# Patient Record
Sex: FEMALE | HISPANIC OR LATINO | Employment: FULL TIME | URBAN - METROPOLITAN AREA
[De-identification: names, ages, dates, MRNs, and addresses within clinical notes are randomized per-mention and may not be internally consistent; named-entity substitution may affect disease eponyms.]

---

## 2019-11-14 ENCOUNTER — NON-PROVIDER VISIT (OUTPATIENT)
Dept: URGENT CARE | Facility: PHYSICIAN GROUP | Age: 49
End: 2019-11-14

## 2019-11-14 DIAGNOSIS — Z02.1 PRE-EMPLOYMENT DRUG SCREENING: ICD-10-CM

## 2019-11-14 LAB
AMP AMPHETAMINE: NEGATIVE
COC COCAINE: NEGATIVE
INT CON NEG: NEGATIVE
INT CON POS: POSITIVE
MET METHAMPHETAMINES: NEGATIVE
OPI OPIATES: NEGATIVE
PCP PHENCYCLIDINE: NEGATIVE
POC DRUG COMMENT 753798-OCCUPATIONAL HEALTH: NEGATIVE
THC: NEGATIVE

## 2019-11-14 PROCEDURE — 80305 DRUG TEST PRSMV DIR OPT OBS: CPT | Performed by: NURSE PRACTITIONER

## 2020-02-05 ENCOUNTER — OFFICE VISIT (OUTPATIENT)
Dept: URGENT CARE | Facility: PHYSICIAN GROUP | Age: 50
End: 2020-02-05

## 2020-02-05 VITALS
BODY MASS INDEX: 21.71 KG/M2 | SYSTOLIC BLOOD PRESSURE: 102 MMHG | DIASTOLIC BLOOD PRESSURE: 66 MMHG | WEIGHT: 115 LBS | OXYGEN SATURATION: 98 % | HEIGHT: 61 IN | RESPIRATION RATE: 18 BRPM | HEART RATE: 70 BPM | TEMPERATURE: 97.8 F

## 2020-02-05 DIAGNOSIS — K64.4 INFLAMED EXTERNAL HEMORRHOID: ICD-10-CM

## 2020-02-05 DIAGNOSIS — B37.89 CANDIDIASIS OF ANUS: ICD-10-CM

## 2020-02-05 PROCEDURE — 99203 OFFICE O/P NEW LOW 30 MIN: CPT | Performed by: PHYSICIAN ASSISTANT

## 2020-02-05 RX ORDER — HYDROCORTISONE ACETATE 25 MG/1
25 SUPPOSITORY RECTAL EVERY 12 HOURS
Qty: 20 SUPPOSITORY | Refills: 0 | Status: SHIPPED | OUTPATIENT
Start: 2020-02-05 | End: 2020-02-15

## 2020-02-06 NOTE — PROGRESS NOTES
Subjective:      Anjana Perez is a 49 y.o. female who presents with Urticaria (around anus, painful, super itchy, inflammed bumps that come out when using the restroom [id number])    PMH:  has a past medical history of Dental disorder and Pain (09/24/14). She also has no past medical history of Cold, Diabetes, or Seizure ([facility]).  MEDS:   Current Outpatient Medications:   •  methylPREDNISolone (MEDROL DOSEPACK) 4 MG tablet, Take according to package instructions (Patient not taking: Reported on 2/5/2020), Disp: 21 Tab, Rfl: 0  •  naproxen (NAPROSYN) 500 MG Tab, Take 1 Tab by mouth 2 times a day, with meals. (Patient not taking: Reported on 2/5/2020), Disp: 30 Tab, Rfl: 0  •  ibuprofen (MOTRIN) 200 MG TABS, Take 200 mg by mouth every 6 hours as needed., Disp: , Rfl:   ALLERGIES: No Known Allergies  SURGHX:   Past Surgical History:   Procedure Laterality Date   • UMBILICAL HERNIA REPAIR  9/25/2014    Performed by Edmar Hernandez M.D. at SURGERY [facility]   • OTHER ABDOMINAL SURGERY  2009    umbilical hernia repair   • GYN SURGERY      C SECTION     SOCHX:  reports that she has never smoked. She has never used smokeless tobacco. She reports that she does not drink alcohol or use drugs.  FH: Reviewed with patient, not pertinent to this visit.           Patient presents with:  Hemorrhoids, itching around anus, painful, super itchy, inflammed bumps that come out when using the restroom [id number].  Pt has been using otc cream but thinks it is giving her a rash around her anus.  Pt denies bleeding from hemorrhoids, would like some medicine to make them go away.  Pt admits to history of constipation but not hemorrhoids.     Pt has recently started a new job, stands all day.      A qualified  was used to interpret Portuguese during this encounter.  ’s name/ID number was [id number] and mode of interpretation was  iPAD .        Rash   This is a new problem. Episode onset: 2  "weeks. The problem is unchanged. The affected locations include the left buttock and right buttock. The rash is characterized by burning, pain, itchiness, redness and swelling. She was exposed to a new medication (otc hemorrhoid cream). Pertinent negatives include no anorexia, diarrhea, fever or vomiting. Past treatments include cold compress (otc cream). The treatment provided no relief. There is no history of eczema or varicella.       Review of Systems   Constitutional: Negative for fever.   Gastrointestinal: Positive for constipation. Negative for anorexia, blood in stool, diarrhea, melena, nausea and vomiting.   Skin: Positive for itching and rash.   All other systems reviewed and are negative.         Objective:     /66 (BP Location: Right arm, Patient Position: Sitting, BP Cuff Size: Adult)   Pulse 70   Temp 36.6 °C (97.8 °F) (Temporal)   Resp 18   Ht 1.549 m (5' 1\")   Wt 52.2 kg (115 lb)   SpO2 98%   BMI 21.73 kg/m²      Physical Exam  Vitals signs and nursing note reviewed. Exam conducted with a chaperone present.   Constitutional:       Appearance: Normal appearance. She is well-developed, well-groomed and normal weight. She is not ill-appearing or toxic-appearing.   HENT:      Head: Normocephalic and atraumatic.      Right Ear: Tympanic membrane normal.      Left Ear: Tympanic membrane normal.      Nose: Nose normal.      Mouth/Throat:      Mouth: Mucous membranes are moist.      Pharynx: Oropharynx is clear.   Eyes:      Extraocular Movements: Extraocular movements intact.      Conjunctiva/sclera: Conjunctivae normal.      Pupils: Pupils are equal, round, and reactive to light.   Neck:      Musculoskeletal: Normal range of motion and neck supple.   Cardiovascular:      Rate and Rhythm: Normal rate and regular rhythm.      Pulses: Normal pulses.      Heart sounds: Normal heart sounds.   Pulmonary:      Effort: Pulmonary effort is normal.      Breath sounds: Normal breath sounds.   Abdominal: "      Palpations: Abdomen is soft.   Genitourinary:     Rectum: External hemorrhoid present. No anal fissure. Normal anal tone.       Musculoskeletal: Normal range of motion.   Skin:     General: Skin is warm and dry.      Capillary Refill: Capillary refill takes less than 2 seconds.   Neurological:      General: No focal deficit present.      Mental Status: She is alert and oriented to person, place, and time.      Gait: Gait normal.   Psychiatric:         Mood and Affect: Mood normal.         Behavior: Behavior is cooperative.                 Assessment/Plan:     1. Inflamed external hemorrhoid  hydrocortisone (ANUSOL-HC) 25 MG Suppos    nystatin/triamcinolone (MYCOLOG) 095547-1.1 UNIT/GM-% Cream   2. Candidiasis of anus  hydrocortisone (ANUSOL-HC) 25 MG Suppos    nystatin/triamcinolone (MYCOLOG) 036006-7.1 UNIT/GM-% Cream     Pt given handout in Congolese.     PT can use cool compress on affected area for relief of symptoms.      PT can take over the counter meds as follows:  Stool softener (docusate sodium 100mg) one capsule 2 times daily.  Miralax 1 capful 1-2 times daily until stools become soft.   Milk of magnesia 4 Tbs once by mouth followed by 20-40 oz of water daily.   Glycerin rectal suppository if no relief from above.     PT can use these medications until constipation resolves.       PT should follow up with PCP in 1-2 days for re-evaluation if symptoms have not improved.  Discussed red flags and reasons to return to UC or ED.  Pt and/or family verbalized understanding of diagnosis and follow up instructions and was offered informational handout on diagnosis.  PT discharged.

## 2020-02-06 NOTE — PATIENT INSTRUCTIONS
Hemorroides  (Hemorrhoids)  Las hemorroides son venas inflamadas adentro o alrededor del recto o del ano. Hay dos tipos de hemorroides:  · Hemorroides internas. Se cynthia en las venas del interior del recto. Pueden abultarse hacia afuera, irritarse y doler.  · Hemorroides externas. Se producen en las venas externas del ano y pueden sentirse leonela un bulto o kuldip hinchada, dura y dolorosa cerca del ano.  La mayoría de las hemorroides no causan problemas graves y se pueden controlar con tratamientos caseros leonela los cambios en la dieta y el estilo de margy. Si los tratamientos caseros no ayudan con los síntomas, se pueden realizar procedimientos para reducir o extirpar las hemorroides.  CAUSAS  La causa de esta afección es el aumento de la presión en la kuldip anal. Esta presión puede ser causada por distintos factores, por ejemplo:  · Estreñimiento.  · Dificultad para defecar.  · Diarrea.  · Embarazo.  · Obesidad.  · Estar sentado victoria largos períodos.  · Levant[place] objetos pesados u otras actividades que impliquen esfuerzo.  · Sexo anal.  SÍNTOMAS  Los síntomas de esta afección incluyen lo siguiente:  · Dolor.  · Picazón o irritación anal.  · Sangrado rectal.  · Pérdida de materia fecal (heces).  · Inflamación anal.  · Dany o más bultos alrededor del ano.  DIAGNÓSTICO  Esta afección se diagnostica frecuentemente a través de un examen visual. Posiblemente le realicen otros tipos de pruebas o estudios, leonela los siguientes:  · Examen de la kuldip rectal con naz mano enguantada (examen digital rectal).  · Examen del canal anal utilizando un pequeño tubo (anoscopio).  · Análisis de janneth si ha perdido naz cantidad significativa de janneth.  · Un estudio para observar el interior del colon (sigmoidoscopia o colonoscopia).  TRATAMIENTO  Esta afección generalmente se puede tratar en el hogar. Se pueden realizar diversos procedimientos si los cambios en la dieta, en el estilo de margy y otros tratamientos caseros no alivian los  síntomas. Estos procedimientos pueden ayudar a reducir o extirpar las hemorroides completamente. Algunos de estos procedimientos son quirúrgicos y otros no. Algunos de los procedimientos más frecuentes son los siguientes:  · Ligadura con butts elástica. Las bandas elásticas se colocan en la base de las hemorroides para interrumpir la irrigación de janneth.  · Escleroterapia. Se inyecta un medicamento en las hemorroides para reducir vo tamaño.  · Coagulación con wendy infrarroja. Se utiliza un tipo de energía lumínica para eliminar las hemorroides.  · Hemorroidectomía. Las hemorroides se extirpan con cirugía y las venas que las irrigan se atan.  · Hemorroidopexia con grapas. Se usa un dispositivo tipo grapa de forma circular para extirpar las hemorroides y unas grapas para cortar la janneth que se irriga hacia las hemorroides.  INSTRUCCIONES PARA EL CUIDADO EN EL HOGAR  Comida y bebida   · Consuma alimentos con alto contenido de fibra, leonela cereales integrales, porotos, mikey secos, frutas y verduras. Pregúntele a vo médico acerca de joseph productos con fibra añadida en ellos (complementos de fibra).  · Dara suficiente líquido para mantener la orina tali o de color amarillo pálido.  Control del dolor y la hinchazón   · [place] emily de asiento tibios victoria 20 minutos, 3 o 4 veces por día para calmar el dolor y las molestias.  · Si se lo indican, aplique hielo en la kuldip afectada. Usar compresas de hielo entre los emily de asiento puede ser efectivo.  ¨ Ponga el hielo en naz bolsa plástica.  ¨ Coloque naz toalla entre la piel y la bolsa de hielo.  ¨ Coloque el hielo victoria 20 minutos, 2 a 3 veces por día.  Instrucciones generales   · [place] los medicamentos de venta roger y los recetados solamente leonela se lo haya indicado el médico.  · Aplíquese los medicamentos, cremas o supositorios leonela se lo hayan indicado.  · Kevin ejercicios regularmente.  · Vaya al baño cuando sienta la necesidad de defecar. No espere.  · Evite  hacer fuerza al defecar.  · Mantenga la kuldip anal limpia y seca. Use papel higiénico húmedo o toallitas humedecidas después de defecar.  · No pase mucho tiempo sentado en el inodoro. [place] aumenta la afluencia de janneth y el dolor.  SOLICITE ATENCIÓN MÉDICA SI:  · Aumenta el dolor y la hinchazón, y no puede controlarlos con los medicamentos o con un tratamiento.  · Tiene naz hemorragia que no puede controlar.  · No puede defecar o lo hace con dificultad.  · Siente dolor o tiene inflamación fuera de la kuldip de las hemorroides.  Esta información no tiene leonela fin reemplazar el consejo del médico. Asegúrese de hacerle al médico cualquier pregunta que tenga.  Document Released: 12/18/2006 Document Revised: 04/10/2017 Document Reviewed: 08/31/2016  [employer] Interactive Patient Education © 2017 Elsevier Inc.

## 2020-02-08 ASSESSMENT — ENCOUNTER SYMPTOMS
CONSTIPATION: 1
DIARRHEA: 0
VOMITING: 0
ANOREXIA: 0
FEVER: 0
BLOOD IN STOOL: 0
NAUSEA: 0

## 2020-07-06 ENCOUNTER — OFFICE VISIT (OUTPATIENT)
Dept: URGENT CARE | Facility: PHYSICIAN GROUP | Age: 50
End: 2020-07-06
Payer: COMMERCIAL

## 2020-07-06 VITALS
BODY MASS INDEX: 22.66 KG/M2 | HEART RATE: 86 BPM | HEIGHT: 61 IN | DIASTOLIC BLOOD PRESSURE: 62 MMHG | WEIGHT: 120 LBS | TEMPERATURE: 98.6 F | SYSTOLIC BLOOD PRESSURE: 118 MMHG | RESPIRATION RATE: 16 BRPM | OXYGEN SATURATION: 99 %

## 2020-07-06 DIAGNOSIS — H10.13 ALLERGIC CONJUNCTIVITIS OF BOTH EYES: ICD-10-CM

## 2020-07-06 DIAGNOSIS — R09.82 PND (POST-NASAL DRIP): ICD-10-CM

## 2020-07-06 PROCEDURE — 99214 OFFICE O/P EST MOD 30 MIN: CPT | Performed by: PHYSICIAN ASSISTANT

## 2020-07-06 RX ORDER — OLOPATADINE HYDROCHLORIDE 1 MG/ML
1 SOLUTION/ DROPS OPHTHALMIC 2 TIMES DAILY
Qty: 5 ML | Refills: 3 | Status: SHIPPED | OUTPATIENT
Start: 2020-07-06

## 2020-07-06 RX ORDER — FLUTICASONE PROPIONATE 50 MCG
2 SPRAY, SUSPENSION (ML) NASAL DAILY
Qty: 16 G | Refills: 0 | Status: SHIPPED | OUTPATIENT
Start: 2020-07-06

## 2020-07-06 RX ORDER — METHYLPREDNISOLONE 4 MG/1
4 TABLET ORAL DAILY
COMMUNITY

## 2020-07-06 ASSESSMENT — ENCOUNTER SYMPTOMS
PHOTOPHOBIA: 0
SHORTNESS OF BREATH: 0
BLURRED VISION: 0
EYE DISCHARGE: 1
VOMITING: 0
SPUTUM PRODUCTION: 0
FEVER: 0
EYE PAIN: 0
WHEEZING: 0
SORE THROAT: 0
EYE REDNESS: 1
DOUBLE VISION: 0
COUGH: 0
ABDOMINAL PAIN: 0
CHILLS: 0
NAUSEA: 0
DIARRHEA: 0

## 2020-07-06 ASSESSMENT — VISUAL ACUITY: OU: 1

## 2020-07-06 NOTE — PROGRESS NOTES
"Subjective:   Anjana Perez  is a 49 y.o. female who presents for Eye Drainage (was diagnosed with an allergy at [facility] pt wants to know if it is an infection or allergy x 8 days)    Visit completed with use of translating software    Eye Drainage   Associated symptoms include congestion. Pertinent negatives include no abdominal pain, chills, coughing, fever, nausea, rash, sore throat or vomiting.   Patient is a 49-year-old female comes to clinic complaining of bilateral eye irritation times last approximate 7 to 8 days.  She states she did go to [place] urgent care where she was prescribed Medrol Dosepak as well as Polytrim for apparent eye infection.  She is become concerned that it is unlikely this is an infection and more likely allergies.  She states she has waxed and waned between trying the medication she was given as well as using a topical antibiotic ointment to the skin surrounding eyes.  She denies fevers chills or sore throat.  She denies ear pain or cough.  She notes some history with sinus congestion suspected seasonal allergies.  She denies other treatments currently.  She denies crusting of eyelids.    Review of Systems   Constitutional: Negative for chills and fever.   HENT: Positive for congestion. Negative for ear pain and sore throat.    Eyes: Positive for discharge ( mild, clear) and redness ( mildly). Negative for blurred vision, double vision, photophobia and pain.   Respiratory: Negative for cough, sputum production, shortness of breath and wheezing.    Gastrointestinal: Negative for abdominal pain, diarrhea, nausea and vomiting.   Skin: Negative for rash.   Endo/Heme/Allergies: Environmental allergies:  ?       No Known Allergies     Objective:   /62 (BP Location: Left arm, Patient Position: Sitting, BP Cuff Size: Adult)   Pulse 86   Temp 37 °C (98.6 °F) (Temporal)   Resp 16   Ht 1.549 m (5' 1\")   Wt 54.4 kg (120 lb)   SpO2 99%   BMI 22.67 kg/m² "     Physical Exam  Vitals signs and nursing note reviewed.   Constitutional:       General: She is not in acute distress.     Appearance: She is well-developed. She is not diaphoretic.   HENT:      Head: Normocephalic and atraumatic.      Right Ear: Tympanic membrane, ear canal and external ear normal.      Left Ear: Tympanic membrane, ear canal and external ear normal.      Nose: Nose normal.      Mouth/Throat:      Pharynx: Uvula midline. Posterior oropharyngeal erythema ( mild PND) present. No oropharyngeal exudate.      Tonsils: No tonsillar abscesses.   Eyes:      General: Lids are normal. Vision grossly intact. Gaze aligned appropriately. Allergic shiner present. No scleral icterus.        Right eye: No discharge.         Left eye: No discharge.      Extraocular Movements: Extraocular movements intact.      Conjunctiva/sclera:      Right eye: Right conjunctiva is injected ( trace bilat). No chemosis, exudate or hemorrhage.     Left eye: Left conjunctiva is injected. No chemosis, exudate or hemorrhage.  Neck:      Musculoskeletal: Neck supple.   Pulmonary:      Effort: Pulmonary effort is normal. No respiratory distress.      Breath sounds: No decreased breath sounds, wheezing, rhonchi or rales.   Musculoskeletal: Normal range of motion.   Lymphadenopathy:      Cervical: Cervical adenopathy ( mild bilat) present.   Skin:     General: Skin is warm and dry.      Coloration: Skin is not pale.      Findings: No erythema.   Neurological:      Mental Status: She is alert and oriented to person, place, and time. She is not disoriented.   Psychiatric:         Speech: Speech normal.         Behavior: Behavior normal.         Assessment/Plan:   1. Allergic conjunctivitis of both eyes  - olopatadine (PATANOL) 0.1 % ophthalmic solution; Place 1 Drop in both eyes 2 times a day.  Dispense: 5 mL; Refill: 3    2. PND (post-nasal drip)  - fluticasone (FLONASE) 50 MCG/ACT nasal spray; Spray 2 Sprays in nose every day.  Dispense:  16 g; Refill: 0    Other orders  - methylPREDNISolone (MEDROL) 4 MG Tab; Take 4 mg by mouth every day.  - neomycin-polymixin-hc (CORTISPORIN, PATIENT SUPPLIED,) Suspension; Place  in right ear.  Supportive care is reviewed with patient/caregiver - recommend to push PO fluids and electrolytes, patient can stop antibiotic drops, begin antihistamine drops, okay to use lubricating drops over-the-counter if difficult to obtain, sent with Flonase, discussed management of allergies and red flag symptoms to return  Return to clinic with lack of resolution or progression of symptoms.      I have worn an N95 mask, gloves and eye protection for the entire encounter with this patient.     Differential diagnosis, natural history, supportive care, and indications for immediate follow-up discussed.

## 2020-07-06 NOTE — PATIENT INSTRUCTIONS
Conjuntivitis alérgica  Allergic Conjunctivitis  Naz membrana transparente (conjuntiva) cubre la parte [place]incoln del piper y la superficie interna del párpado. La conjuntivitis alérgica se produce cuando esta membrana se inflama. [place] es consecuencia de las alergias. Entre las causas comunes de las reacciones alérgicas (alérgenos)se incluyen las siguientes:  · Alérgenos de exterior, por ejemplo:  ? Polen.  ? Césped y malezas.  ? Esporas del moho.  · Alérgenos de interiores, por ejemplo:  ? Polvo.  ? Humo.  ? Moho.  ? La caspa de las mascotas.  ? Pelo de animales.  Esta afección puede hacer que los ojos tengan un color hanson o laura. También puede causar picazón en los ojos. Esta afección no se puede transmitir de naz persona a la otra (no es contagiosa).  Siga estas indicaciones en vo casa:  · Intente mantenerse alejado de aquello a lo que tiene alergia.  · [place] o aplique los medicamentos de venta roger y los recetados solamente leonela se lo haya indicado el médico. Estos incluyen cualquier gota oftálmica.  · Aplíquese un paño limpio y frío en el piper victoria 10 a 20 minutos. Hágalo 3 o 4 veces al día.  · No se toque ni se frote los ojos.  · No use lentes de contacto hasta que la inflamación haya desaparecido. En vo lugar, use anteojos.  · No use maquillaje en los ojos hasta que la inflamación haya desaparecido.  · Concurra a todas las visitas de control leonela se lo haya indicado el médico. [place] es importante.  Comuníquese con un médico si:  · Los síntomas empeoran.  · Los síntomas no mejoran con el tratamiento.  · Tiene dolor leve en el piper.  · Tiene sensibilidad a la wendy.  · Tiene manchas o ampollas en los ojos.  · Le sale pus del piper.  · Tiene fiebre.  Solicite ayuda de inmediato si:  · Tiene enrojecimiento, hinchazón u otros síntomas en un solo piper.  · Tiene visión borrosa.  · Nota cambios en la visión.  · Siente mucho dolor en el piper.  Resumen  · La conjuntivitis alérgica es causada por alergias. Puede producir  enrojecimiento o naz coloración rosada de los ojos y provocar picazón.  · Esta afección no se puede transmitir de naz persona a la otra (no es contagiosa).  · Intente mantenerse alejado de aquello a lo que tiene alergia.  · [place] o aplique los medicamentos de venta roger y los recetados solamente leonela se lo haya indicado el médico. Estos incluyen cualquier gota oftálmica.  · Comuníquese con el médico si los síntomas empeoran o no mejoran con el tratamiento.  Esta información no tiene leonela fin reemplazar el consejo del médico. Asegúrese de hacerle al médico cualquier pregunta que tenga.  Document Released: 12/06/2012 Document Revised: 06/12/2018 Document Reviewed: 09/29/2015  Elsevier Patient Education © 2020 Elsevier Inc.